# Patient Record
Sex: MALE | Race: WHITE | NOT HISPANIC OR LATINO | Employment: OTHER | URBAN - METROPOLITAN AREA
[De-identification: names, ages, dates, MRNs, and addresses within clinical notes are randomized per-mention and may not be internally consistent; named-entity substitution may affect disease eponyms.]

---

## 2022-01-22 ENCOUNTER — HOSPITAL ENCOUNTER (EMERGENCY)
Facility: MEDICAL CENTER | Age: 62
End: 2022-01-22
Attending: EMERGENCY MEDICINE
Payer: MEDICARE

## 2022-01-22 ENCOUNTER — APPOINTMENT (OUTPATIENT)
Dept: RADIOLOGY | Facility: MEDICAL CENTER | Age: 62
End: 2022-01-22
Attending: EMERGENCY MEDICINE
Payer: MEDICARE

## 2022-01-22 VITALS
SYSTOLIC BLOOD PRESSURE: 110 MMHG | RESPIRATION RATE: 21 BRPM | WEIGHT: 131 LBS | HEIGHT: 67 IN | HEART RATE: 70 BPM | TEMPERATURE: 98.2 F | BODY MASS INDEX: 20.56 KG/M2 | OXYGEN SATURATION: 96 % | DIASTOLIC BLOOD PRESSURE: 60 MMHG

## 2022-01-22 DIAGNOSIS — R55 NEAR SYNCOPE: ICD-10-CM

## 2022-01-22 DIAGNOSIS — E86.0 DEHYDRATION: ICD-10-CM

## 2022-01-22 LAB
ALBUMIN SERPL BCP-MCNC: 3.7 G/DL (ref 3.2–4.9)
ALBUMIN/GLOB SERPL: 2.5 G/DL
ALP SERPL-CCNC: 77 U/L (ref 30–99)
ALT SERPL-CCNC: 15 U/L (ref 2–50)
ANION GAP SERPL CALC-SCNC: 11 MMOL/L (ref 7–16)
APPEARANCE UR: CLEAR
AST SERPL-CCNC: 14 U/L (ref 12–45)
BASOPHILS # BLD AUTO: 0.6 % (ref 0–1.8)
BASOPHILS # BLD: 0.04 K/UL (ref 0–0.12)
BILIRUB SERPL-MCNC: 0.3 MG/DL (ref 0.1–1.5)
BILIRUB UR QL STRIP.AUTO: NEGATIVE
BUN SERPL-MCNC: 55 MG/DL (ref 8–22)
CALCIUM SERPL-MCNC: 8.5 MG/DL (ref 8.5–10.5)
CHLORIDE SERPL-SCNC: 106 MMOL/L (ref 96–112)
CO2 SERPL-SCNC: 24 MMOL/L (ref 20–33)
COLOR UR: YELLOW
CREAT SERPL-MCNC: 1.19 MG/DL (ref 0.5–1.4)
EKG IMPRESSION: NORMAL
EOSINOPHIL # BLD AUTO: 0.14 K/UL (ref 0–0.51)
EOSINOPHIL NFR BLD: 2.1 % (ref 0–6.9)
ERYTHROCYTE [DISTWIDTH] IN BLOOD BY AUTOMATED COUNT: 51.9 FL (ref 35.9–50)
GLOBULIN SER CALC-MCNC: 1.5 G/DL (ref 1.9–3.5)
GLUCOSE SERPL-MCNC: 206 MG/DL (ref 65–99)
GLUCOSE UR STRIP.AUTO-MCNC: NEGATIVE MG/DL
HCT VFR BLD AUTO: 32.3 % (ref 42–52)
HGB BLD-MCNC: 10.6 G/DL (ref 14–18)
IMM GRANULOCYTES # BLD AUTO: 0.02 K/UL (ref 0–0.11)
IMM GRANULOCYTES NFR BLD AUTO: 0.3 % (ref 0–0.9)
KETONES UR STRIP.AUTO-MCNC: NEGATIVE MG/DL
LACTATE BLD-SCNC: 1.6 MMOL/L (ref 0.5–2)
LEUKOCYTE ESTERASE UR QL STRIP.AUTO: NEGATIVE
LYMPHOCYTES # BLD AUTO: 1.85 K/UL (ref 1–4.8)
LYMPHOCYTES NFR BLD: 28.1 % (ref 22–41)
MCH RBC QN AUTO: 33.4 PG (ref 27–33)
MCHC RBC AUTO-ENTMCNC: 32.8 G/DL (ref 33.7–35.3)
MCV RBC AUTO: 101.9 FL (ref 81.4–97.8)
MICRO URNS: NORMAL
MONOCYTES # BLD AUTO: 0.66 K/UL (ref 0–0.85)
MONOCYTES NFR BLD AUTO: 10 % (ref 0–13.4)
NEUTROPHILS # BLD AUTO: 3.87 K/UL (ref 1.82–7.42)
NEUTROPHILS NFR BLD: 58.9 % (ref 44–72)
NITRITE UR QL STRIP.AUTO: NEGATIVE
NRBC # BLD AUTO: 0 K/UL
NRBC BLD-RTO: 0 /100 WBC
PH UR STRIP.AUTO: 5.5 [PH] (ref 5–8)
PLATELET # BLD AUTO: 215 K/UL (ref 164–446)
PMV BLD AUTO: 11.2 FL (ref 9–12.9)
POTASSIUM SERPL-SCNC: 4.4 MMOL/L (ref 3.6–5.5)
PROT SERPL-MCNC: 5.2 G/DL (ref 6–8.2)
PROT UR QL STRIP: NEGATIVE MG/DL
RBC # BLD AUTO: 3.17 M/UL (ref 4.7–6.1)
RBC UR QL AUTO: NEGATIVE
SODIUM SERPL-SCNC: 141 MMOL/L (ref 135–145)
SP GR UR STRIP.AUTO: 1.01
TROPONIN T SERPL-MCNC: 13 NG/L (ref 6–19)
UROBILINOGEN UR STRIP.AUTO-MCNC: 0.2 MG/DL
WBC # BLD AUTO: 6.6 K/UL (ref 4.8–10.8)

## 2022-01-22 PROCEDURE — 93005 ELECTROCARDIOGRAM TRACING: CPT | Performed by: EMERGENCY MEDICINE

## 2022-01-22 PROCEDURE — 83605 ASSAY OF LACTIC ACID: CPT

## 2022-01-22 PROCEDURE — 71045 X-RAY EXAM CHEST 1 VIEW: CPT

## 2022-01-22 PROCEDURE — 81003 URINALYSIS AUTO W/O SCOPE: CPT

## 2022-01-22 PROCEDURE — 99284 EMERGENCY DEPT VISIT MOD MDM: CPT

## 2022-01-22 PROCEDURE — 85025 COMPLETE CBC W/AUTO DIFF WBC: CPT

## 2022-01-22 PROCEDURE — 80053 COMPREHEN METABOLIC PANEL: CPT

## 2022-01-22 PROCEDURE — 84484 ASSAY OF TROPONIN QUANT: CPT

## 2022-01-22 PROCEDURE — 700105 HCHG RX REV CODE 258: Performed by: EMERGENCY MEDICINE

## 2022-01-22 RX ORDER — SODIUM CHLORIDE 9 MG/ML
1000 INJECTION, SOLUTION INTRAVENOUS ONCE
Status: COMPLETED | OUTPATIENT
Start: 2022-01-22 | End: 2022-01-22

## 2022-01-22 RX ADMIN — SODIUM CHLORIDE 1000 ML: 9 INJECTION, SOLUTION INTRAVENOUS at 21:29

## 2022-01-23 NOTE — ED TRIAGE NOTES
Chief Complaint   Patient presents with   • Bradycardia     Dizziness while standing; EMS found sinus bradycardia on arrival   • Hypotension     Resolved with resolution of bradycardia.     Pt presents to the ED via EMS due to sudden onset dizziness spell which occurred while standing. Pt was able to sit down before falling. No loss of consciousness.   Pt in Normal sinus rhythm upon arrival at ED.   Pt is A&O x4

## 2022-01-23 NOTE — ED NOTES
Patient educated on discharge instructions, follow up appointments, prescriptions, and home care. Patient verbalized understanding. Patient ambulated to [facility].

## 2022-01-23 NOTE — ED PROVIDER NOTES
ED Provider Note    CHIEF COMPLAINT  Chief Complaint   Patient presents with   • Bradycardia     Dizziness while standing; EMS found sinus bradycardia on arrival   • Hypotension     Resolved with resolution of bradycardia.       HPI  Priyank Thorne is a 61 y.o. male who presents for evaluation of an episode of near syncope while standing in the kitchen tonight.  Patient notes he was simply standing in the kitchen and started feeling very dizzy, lightheaded, and sweaty.  His friend, who reviewed his staying with, noted he was very pale and he leaned over the kitchen island to help alleviate the symptoms.  He did not pass out and was able to sit down.  EMS was called and on arrival they noted him to be bradycardic and gave him 1 mg of atropine.  Unclear what the initial rhythm actually was as there is no rhythm strip.  Patient notes he has no history of these issues and did not have any chest pain or shortness of breath during the event.  He notes he has had cardiac stents placed and recently fractured his left proximal humerus during a ground-level fall which was mechanical and unrelated to any near syncope events such as today.    REVIEW OF SYSTEMS  Constitutional: No fevers or chills  Skin: No rashes, abrasions, lacerations  HEENT: No sore throat, runny nose, sores, trouble swallowing, trouble speaking.  Neck: No neck pain, stiffness, or masses.  Chest: No pain or rashes  Pulm: No shortness of breath, cough, wheezing, stridor, or pain with inspiration/expiration  Gastrointestinal: No nausea, vomiting, diarrhea, constipation, bloating, melena, hematochezia or abdominal pain.  Genitourinary: No dysuria or hematuria  Musculoskeletal: Pain to left upper arm which patient states is at baseline after a fracture recently and is not new or worsened today.  Neurologic: No numbness, tingling, or focal motor weakness. No current confusion or disorientation.   Heme: No bleeding or bruising problems.   Immuno: No hx of  "recurrent infections    PAST FAM HISTORY  No family history on file.    PAST MEDICAL HISTORY   Coronary artery disease, hypertension, insulin-dependent diabetes, myocardial infarction, CVA    SOCIAL HISTORY  Social History     Tobacco Use   • Smoking status: Never Smoker   • Smokeless tobacco: Never Used   Substance and Sexual Activity   • Alcohol use: Not on file   • Drug use: Not on file   • Sexual activity: Not on file       SURGICAL HISTORY  patient denies any surgical history    CURRENT MEDICATIONS  Home Medications    **Home medications have not yet been reviewed for this encounter**         ALLERGIES  No Known Allergies    PHYSICAL EXAM  VITAL SIGNS: /60   Pulse 70   Temp 36.8 °C (98.2 °F) (Temporal)   Resp (!) 21   Ht 1.702 m (5' 7\")   Wt 59.4 kg (131 lb)   SpO2 96%   BMI 20.52 kg/m²    Gen: Alert in no apparent distress.  HEENT: No signs of trauma, Bilateral external ears normal, Nose normal. Conjunctiva normal, Non-icteric.  PERRLA.  Cardiovascular: Regular rate and rhythm, no murmurs.  Capillary refill less than 3 seconds to all extremities, 2+ distal pulses.  Thorax & Lungs: Normal breath sounds, No respiratory distress, No wheezing bilateral chest rise  Abdomen: Bowel sounds normal, Soft, No tenderness, No masses, No pulsatile masses. No Guarding or rebound  Skin: Warm, Dry, No erythema, No rash noted to exposed areas.   Back: No bony tenderness, No CVA tenderness.   Extremities: Intact distal pulses, No edema.  Left upper extremity in a sling, tenderness to the proximal humerus.  Neurologic: Alert , no facial droop, grossly normal coordination and strength to all extremities with the exception of the left upper extremity which is in a sling  Psychiatric: Affect pleasant      LABS  Results for orders placed or performed during the hospital encounter of 01/22/22   CBC WITH DIFFERENTIAL   Result Value Ref Range    WBC 6.6 4.8 - 10.8 K/uL    RBC 3.17 (L) 4.70 - 6.10 M/uL    Hemoglobin 10.6 (L) " 14.0 - 18.0 g/dL    Hematocrit 32.3 (L) 42.0 - 52.0 %    .9 (H) 81.4 - 97.8 fL    MCH 33.4 (H) 27.0 - 33.0 pg    MCHC 32.8 (L) 33.7 - 35.3 g/dL    RDW 51.9 (H) 35.9 - 50.0 fL    Platelet Count 215 164 - 446 K/uL    MPV 11.2 9.0 - 12.9 fL    Neutrophils-Polys 58.90 44.00 - 72.00 %    Lymphocytes 28.10 22.00 - 41.00 %    Monocytes 10.00 0.00 - 13.40 %    Eosinophils 2.10 0.00 - 6.90 %    Basophils 0.60 0.00 - 1.80 %    Immature Granulocytes 0.30 0.00 - 0.90 %    Nucleated RBC 0.00 /100 WBC    Neutrophils (Absolute) 3.87 1.82 - 7.42 K/uL    Lymphs (Absolute) 1.85 1.00 - 4.80 K/uL    Monos (Absolute) 0.66 0.00 - 0.85 K/uL    Eos (Absolute) 0.14 0.00 - 0.51 K/uL    Baso (Absolute) 0.04 0.00 - 0.12 K/uL    Immature Granulocytes (abs) 0.02 0.00 - 0.11 K/uL    NRBC (Absolute) 0.00 K/uL   COMP METABOLIC PANEL   Result Value Ref Range    Sodium 141 135 - 145 mmol/L    Potassium 4.4 3.6 - 5.5 mmol/L    Chloride 106 96 - 112 mmol/L    Co2 24 20 - 33 mmol/L    Anion Gap 11.0 7.0 - 16.0    Glucose 206 (H) 65 - 99 mg/dL    Bun 55 (H) 8 - 22 mg/dL    Creatinine 1.19 0.50 - 1.40 mg/dL    Calcium 8.5 8.5 - 10.5 mg/dL    AST(SGOT) 14 12 - 45 U/L    ALT(SGPT) 15 2 - 50 U/L    Alkaline Phosphatase 77 30 - 99 U/L    Total Bilirubin 0.3 0.1 - 1.5 mg/dL    Albumin 3.7 3.2 - 4.9 g/dL    Total Protein 5.2 (L) 6.0 - 8.2 g/dL    Globulin 1.5 (L) 1.9 - 3.5 g/dL    A-G Ratio 2.5 g/dL   TROPONIN   Result Value Ref Range    Troponin T 13 6 - 19 ng/L   LACTIC ACID   Result Value Ref Range    Lactic Acid 1.6 0.5 - 2.0 mmol/L   URINALYSIS (UA)    Specimen: Urine   Result Value Ref Range    Color Yellow     Character Clear     Specific Gravity 1.015 <1.035    Ph 5.5 5.0 - 8.0    Glucose Negative Negative mg/dL    Ketones Negative Negative mg/dL    Protein Negative Negative mg/dL    Bilirubin Negative Negative    Urobilinogen, Urine 0.2 Negative    Nitrite Negative Negative    Leukocyte Esterase Negative Negative    Occult Blood Negative  Negative    Micro Urine Req see below    ESTIMATED GFR   Result Value Ref Range    GFR If African American >60 >60 mL/min/1.73 m 2    GFR If Non African American >60 >60 mL/min/1.73 m 2   EKG (NOW)   Result Value Ref Range    Report       [facility] Emergency Dept.    Test Date:  2022  Pt Name:    SUSANA RAE              Department: ER  MRN:        [id number]                      Room:        09  Gender:     Male                         Technician: [id number]  :        [id number]                   Requested By:DALJIT MENDOZA  Order #:    [id number]                    Reading MD:    Measurements  Intervals                                Axis  Rate:       63                           P:          39  MO:         172                          QRS:        28  QRSD:       98                           T:          6  QT:         416  QTc:        426    Interpretive Statements  SINUS RHYTHM  EARLY PRECORDIAL R/S TRANSITION  PROBABLE INFERIOR INFARCT, AGE INDETERMINATE  No previous ECG available for comparison         RADIOLOGY  DX-CHEST-PORTABLE (1 VIEW)   Final Result      No radiographic evidence of acute cardiopulmonary process.          HYDRATION: Based on the patient's presentation of Inability to take oral fluids the patient was given IV fluids. IV Hydration was used because oral hydration was not adequate alone. Upon recheck following hydration, the patient was stable.    COURSE & MEDICAL DECISION MAKING  Patient arrives for evaluation of what appears to be a near syncopal episode today while standing.  Patient relates fairly typical prodrome for either vasovagal or orthostatic syncope and, given his relative bradycardia by her EMS report, I suspect this was a vasovagal episode.  Patient describes no symptoms to suggest an anginal equivalent and had no palpitations to suggest tachyarrhythmia.  He is noted to be on beta-blockers as well as lisinopril which may have contributed to the episode  however it is unclear what his blood pressure and heart rate were at the exact time of the incident.  Patient will be evaluated by serum diagnostics as well as EKG, and chest x-ray.  I will empirically hydrate him as well.  He has had no recent symptoms to suggest an infectious etiology and I do not suspect COVID.  He has no chest pain or shortness of breath and I very much doubt PE as the source of his near syncope.    Patient's diagnostics returned reassuring although he is noted to be mildly anemic which he states is normal for him, although his last hemoglobin level is not available to us.  He has had no stigmata of GI bleeding and I do not feel his elevated BUN is related to an upper GI bleed.  I suspect that he is dehydrated to a degree and this is the source of the BUN elevation.  He will avoid taking his antihypertensives and especially his Coreg for the next 24 hours and push fluids.  I advised him to get a blood pressure cuff and take both his blood pressure and heart rate before administering his own medications as he could induce bradycardia and a second syncopal episode if he is not careful.  I do not feel inpatient evaluation is necessary at this time and I feel he is safe for continued outpatient therapy.  He was told to follow-up closely when he returns to [place] with his primary care physician.  He will return if his symptoms worsen or change in any way.  FINAL IMPRESSION  1. Near syncope    2. Dehydration        Electronically signed by: Santos Rodriguez M.D., 1/22/2022 8:33 PM